# Patient Record
(demographics unavailable — no encounter records)

---

## 2025-04-08 NOTE — IMAGING
[de-identified] : General: Alignment: normal Spinous process: no tenderness Paraspinal tenderness: tender to palpation b/l paraspinal muscles Range of motion: full and pain free Scoliosis: none  Strength:  Hip Flexors: 5/5 b/l Quadriceps: 5/5 b/l Dorsi/Plantor flexion: 5/5 b/l EHL: 5/5 b/l  Sensation: Intact b/l Straight Leg Raise: Negative b/l    XR taken 2v lumbar: mild degenerative changes, no fractures 1v pelvis: no significant OA no fractures  12/16/24 MRI of the lumbar spine ([facility]) 1. Impression: Multiple disc herniation in the mid to lower lumbar spine asymmetric towards the right 2. Impingement with posterior inferior displacement of the right S1 nerve root and right lateral recess stenosis, mild central stenosis left S1 nerve root impingement and right greater than exiting L5 nerve root impingement L5-S1 3. Impingement upon the right exiting L4 nerve root at L4-L5 4.Mild central stenosis and right exiting L3 nerve root impingement with posterior superior displacement in the right neural foramen at L3-L4 5. Vestigial S1-S2 disc segment and anterior spondylosis at L1-L2 6. No acute fracture or gross malalignment    Transcription: Horacio Woody MD 12/17/24

## 2025-04-08 NOTE — ASSESSMENT
[FreeTextEntry1] : 51 y/o M with acute low back pain Has been seen and treated by outside orthopedic doctor with new flare up of pain  MDP prescribed, discussed importance of checking BS while taking medication and to stop if BS rises, consult with medical doctor to make sure no issues with taking medication Meloxicam prescribed, discussed to start once MDP finishes Follow up with spine specialist 1-2 weeks Out of work until follow up with specialist

## 2025-04-08 NOTE — HISTORY OF PRESENT ILLNESS
[Lower back] : lower back [10] : 10 [9] : 9 [Localized] : localized [Nothing helps with pain getting better] : Nothing helps with pain getting better [Sitting] : sitting [Standing] : standing [Walking] : walking [Lying in bed] : lying in bed [FreeTextEntry7] : RT HIP RT PELVIC  [de-identified] : LUMBAR PAIN PAIN 50 y male present with LUMBAR PAIN x2 days. Had an episode in December of the pain. Went to PT and had MRI. States it was improving and two days ago he had a flare of pain. Took ibuprofen for the pain. No pain radiating down the legs. No numbness or tingling.    [] : no [FreeTextEntry5] : LUMBAR PAIN PAIN

## 2025-04-08 NOTE — ASSESSMENT
[FreeTextEntry1] : 49 y/o M with acute low back pain Has been seen and treated by outside orthopedic doctor with new flare up of pain  MDP prescribed, discussed importance of checking BS while taking medication and to stop if BS rises, consult with medical doctor to make sure no issues with taking medication Meloxicam prescribed, discussed to start once MDP finishes Follow up with spine specialist 1-2 weeks Out of work until follow up with specialist

## 2025-04-08 NOTE — HISTORY OF PRESENT ILLNESS
[Lower back] : lower back [10] : 10 [9] : 9 [Localized] : localized [Nothing helps with pain getting better] : Nothing helps with pain getting better [Sitting] : sitting [Standing] : standing [Walking] : walking [Lying in bed] : lying in bed [FreeTextEntry7] : RT HIP RT PELVIC  [de-identified] : LUMBAR PAIN PAIN 50 y male present with LUMBAR PAIN x2 days. Had an episode in December of the pain. Went to PT and had MRI. States it was improving and two days ago he had a flare of pain. Took ibuprofen for the pain. No pain radiating down the legs. No numbness or tingling.    [] : no [FreeTextEntry5] : LUMBAR PAIN PAIN

## 2025-04-08 NOTE — IMAGING
[de-identified] : General: Alignment: normal Spinous process: no tenderness Paraspinal tenderness: tender to palpation b/l paraspinal muscles Range of motion: full and pain free Scoliosis: none  Strength:  Hip Flexors: 5/5 b/l Quadriceps: 5/5 b/l Dorsi/Plantor flexion: 5/5 b/l EHL: 5/5 b/l  Sensation: Intact b/l Straight Leg Raise: Negative b/l    XR taken 2v lumbar: mild degenerative changes, no fractures 1v pelvis: no significant OA no fractures  12/16/24 MRI of the lumbar spine ([facility]) 1. Impression: Multiple disc herniation in the mid to lower lumbar spine asymmetric towards the right 2. Impingement with posterior inferior displacement of the right S1 nerve root and right lateral recess stenosis, mild central stenosis left S1 nerve root impingement and right greater than exiting L5 nerve root impingement L5-S1 3. Impingement upon the right exiting L4 nerve root at L4-L5 4.Mild central stenosis and right exiting L3 nerve root impingement with posterior superior displacement in the right neural foramen at L3-L4 5. Vestigial S1-S2 disc segment and anterior spondylosis at L1-L2 6. No acute fracture or gross malalignment    Transcription: Horacio Woody MD 12/17/24

## 2025-04-17 NOTE — DATA REVIEWED
[MRI] : MRI [Lumbar Spine] : lumbar spine [Report was reviewed and noted in the chart] : The report was reviewed and noted in the chart [I independently reviewed and interpreted images and report] : I independently reviewed and interpreted images and report [I reviewed the films/CD and agree] : I reviewed the films/CD and agree [FreeTextEntry1] : R sided HNP/extrusion L5-S1 with R neural impingement, loss of signal at L3-4 R foraminal stenosis

## 2025-04-17 NOTE — ASSESSMENT
[FreeTextEntry1] : 49 yo M with acute R sided low back pain and now resolved RLE radiculopathy. MRI LS with HNP at L5-S1 with neural impingement, L3-4. Has improved with time and PT, but continues with residual R sided lumbar pain.   - Will do another round of PT for core strengthening - Mobic/muscle relaxant prn - cleared to return to work full duty 4/21/25 - f/u in 4-6 weeks for re-eval  Patient progress note completed by Ledy Plummer PA-C acting as scribe under the supervision of Erlin Maher MD Patient seen by Erlin Maher MD

## 2025-04-17 NOTE — HISTORY OF PRESENT ILLNESS
[Lower back] : lower back [de-identified] : 51 yo M presenting with low back pain x 10/2024 after lifting up his kid. [facility], has been doing PT over the last few months that was helpful,  In Dec had radiating pain down the R leg but that resolved. Pain was reaggravated early April 2025 after going to pick something off the floor. Continues to have R sided back pain. No n/t. Worse with sitting, bending, laying for prolonged periods. Saw Justin GRUBBS in , was prescribed MDP and took muscle relaxant which helped. Has mobic but did not take. No prior back issues. No prior injections or spinal surgeries.   occupation - RN, has been oow since 4/8  pmhx- DM and HTN. HLD.

## 2025-04-17 NOTE — IMAGING
[de-identified] : R lumbar paraspinal muscle tenderness R lumbar paraspinal muscle spasm   pain with flexion minimal pain with extension full ROM with mild stiffness   motor exam 5/5 strength bilaterally sensory intact -SLR no dermatome of pain at this time   ambulates without assistive device non antalgic gait able to heel/toe walk